# Patient Record
(demographics unavailable — no encounter records)

---

## 2017-12-30 NOTE — EDM.PDOC
ED HPI GENERAL MEDICAL PROBLEM





- General


Chief Complaint: Upper Extremity Injury/Pain


Stated Complaint: LT HAND PAIN


Time Seen by Provider: 12/30/17 12:15


Source of Information: Reports: Patient


History Limitations: Reports: No Limitations





- History of Present Illness


INITIAL COMMENTS - FREE TEXT/NARRATIVE: 





42 y.o.w.f came to the ed due to pain at the base of her right thumb for 1 week 

or so. Pt was seen at her PMD who recommended a splint and motrin. Pt stated, 

those measures do not help. Pt denied trauma, is left handed, has FROM of all 

of her fingers. No other acute medical issues.  /61 pulse 80 Temp 36.6, 

Pulse ox 98% RR 18


Onset Date: 12/23/17


Onset Time: 07:00


Duration: Day(s):, Intermittent


Location: Reports: Upper Extremity, Right


Quality: Reports: Dull


Severity: Mild


Improves with: Reports: Rest


Worsens with: Reports: Movement


Context: Reports: Other (does not remember )


Associated Symptoms: Reports: No Other Symptoms


  ** right hand


Pain Score (Numeric/FACES): 7





- Related Data


 Allergies











Allergy/AdvReac Type Severity Reaction Status Date / Time


 


codeine Allergy  Vomiting Verified 12/30/17 12:36











Home Meds: 


 Home Meds





. [Unable to Verify Home Med List]  12/30/17 [History]











Review of Systems





- Review of Systems


Review Of Systems: See Below


Constitutional: Reports: No Symptoms


Eyes: Reports: No Symptoms


Ears: Reports: No Symptoms


Nose: Reports: No Symptoms


Mouth/Throat: Reports: No Symptoms


Respiratory: Reports: No Symptoms


Cardiovascular: Reports: No Symptoms


GI/Abdominal: Reports: No Symptoms


Genitourinary: Reports: No Symptoms


Musculoskeletal: Reports: Hand Pain (r thumb)


Skin: Reports: No Symptoms


Neurological: Reports: No Symptoms


Psychiatric: Reports: No Symptoms





ED EXAM, GENERAL





- Physical Exam


Exam: See Below


Exam Limited By: No Limitations


General Appearance: Alert, WD/WN, No Apparent Distress


Eye Exam: Bilateral Eye: Normal Inspection


Ears: Normal External Exam


Ear Exam: Bilateral Ear: Auricle Normal


Nose: Normal Inspection


Throat/Mouth: Normal Inspection


Head: Atraumatic


Neck: Normal Inspection


Respiratory/Chest: No Respiratory Distress


Cardiovascular: Normal Peripheral Pulses


Peripheral Pulses: 2+: Radial (L)


GI/Abdominal: Normal Bowel Sounds, Soft


 (Female) Exam: Deferred


Rectal (Female) Exam: Deferred


Back Exam: Normal Inspection


Extremities: Normal Inspection, Limited Range of Motion (of right thumb due to 

pain)


Neurological: Alert


Psychiatric: Normal Affect, Normal Mood


Skin Exam: Warm, Intact, Normal Color


Lymphatic: No Adenopathy





Course





- Vital Signs


Text/Narrative:: 





42 y.o.w.f came to the ed due to pain at the base of her right thumb for 1 week 

or so. Pt was seen at her PMD who recommended a splint and motrin. Pt stated, 

those measures do not help. Pt denied trauma, is left handed, has FROM of all 

of her fingers. No other acute medical issues.  /61 pulse 80 Temp 36.6, 

Pulse ox 98% RR 18


PE: minor tenderness at the base of the right thumb. FROM


Imaging: Not indicat


Impression: R thumb sprain X 1 week


Tx: Motrin, Ice


Reexam: Improved


Plan: D/C with instructions


Last Recorded V/S: 


 Last Vital Signs











Temp      


 


Pulse  86   12/30/17 12:15


 


Resp  16   12/30/17 12:15


 


BP  130/61   12/30/17 12:15


 


Pulse Ox  98   12/30/17 12:15














Departure





- Departure


Time of Disposition: 12:40


Disposition: Home, Self-Care 01


Condition: Good


Clinical Impression: 


Sprain of right thumb


Qualifiers:


 Encounter type: initial encounter Sprain of finger site: metacarpophalangeal 

joint Qualified Code(s): S63.641A - Sprain of metacarpophalangeal joint of 

right thumb, initial encounter








- Discharge Information


Instructions:  Thumb Sprain


Referrals: 


Kandy Mcmahon NP [Primary Care Provider] - 


Forms:  ED Department Discharge


Additional Instructions: 


Please take Motrin, apply ice to the affected area, Please f/u with your PMD 

and or orthopedic surgeon.

## 2018-04-07 NOTE — EDM.PDOC
ED HPI GENERAL MEDICAL PROBLEM





- General


Chief Complaint: Upper Extremity Injury/Pain


Stated Complaint: RT FINGER PAIN


Time Seen by Provider: 04/07/18 00:45


Source of Information: Reports: Patient


History Limitations: Reports: No Limitations





- History of Present Illness


INITIAL COMMENTS - FREE TEXT/NARRATIVE: 





Dilia comes in for inspection of fingers #3,4,5 on right hand that were injured 

when slammed by a car trunk lid about a week ago. There is resiual pain and 

some stiffness that she is concerned about not improving. She is also worried 

about infection. There is no numbness. She has taken no meds. 


  ** Rt hand


Pain Score (Numeric/FACES): 10





- Related Data


 Allergies











Allergy/AdvReac Type Severity Reaction Status Date / Time


 


codeine Allergy  Vomiting Verified 04/07/18 00:32











Home Meds: 


 Home Meds





. [Unable to Verify Home Med List]  12/30/17 [History]











Past Medical History


Cardiovascular History: Reports: CAD, Hypertension, MI


Endocrine/Metabolic History: Reports: Diabetes, Type II, Hypothyroidism





- Past Surgical History


Cardiovascular Surgical History: Reports: Coronary Artery Bypass





Social & Family History





- Family History


Family Medical History: Noncontributory


Endocrine/Metabolic: Reports: Diabetes, Type I





- Tobacco Use


Smoking Status *Q: Current Every Day Smoker


Years of Tobacco use: 20


Packs/Tins Daily: 0.5





- Caffeine Use


Caffeine Use: Reports: Coffee, Soda





- Recreational Drug Use


Recreational Drug Use: No





Review of Systems





- Review of Systems


Review Of Systems: ROS reveals no pertinent complaints other than HPI.





ED EXAM, GENERAL





- Physical Exam


Exam: See Below


Exam Limited By: No Limitations


General Appearance: Alert, WD/WN, No Apparent Distress


Head: Normocephalic


Neck: Normal Inspection, Supple


Respiratory/Chest: Lungs Clear, Normal Breath Sounds


Cardiovascular: Regular Rate, Rhythm


Back Exam: Normal Inspection


Extremities: Limited Range of Motion (Digits #3,#4.#5 of R hand: healing 

abrasions overlying dorsal surfaces at PIP joints, minor swelling, no erythema 

or warmth, flexion of 45 deg only at PIP, 80 deg at DIP; no deformity of the 

MCP joints or R hand; CMS intact)


Neurological: Alert, Oriented, CN II-XII Intact, Normal Gait, No Motor/Sensory 

Deficits


Psychiatric: Normal Affect, Normal Mood


Skin Exam: Warm, Dry


Lymphatic: No Adenopathy





Course





- Vital Signs


Text/Narrative:: 





No meds were administered at the [facility] ED.


Last Recorded V/S: 





 Last Vital Signs











Temp  36.4 C   04/07/18 00:20


 


Pulse  73   04/07/18 00:20


 


Resp  18   04/07/18 00:20


 


BP  151/75 H  04/07/18 00:20


 


Pulse Ox  100   04/07/18 00:20














Departure





- Departure


Time of Disposition: 00:58


Disposition: Home, Self-Care 01


Condition: Fair


Clinical Impression: 


Abrasion of finger of right hand


Qualifiers:


 Encounter type: initial encounter Qualified Code(s): S60.419A - Abrasion of 

unspecified finger, initial encounter





Contusion of finger of right hand


Qualifiers:


 Encounter type: initial encounter Finger: unspecified finger Qualified Code(s)

: S60.00XA - Contusion of unspecified finger without damage to nail, initial 

encounter








- Discharge Information


Referrals: 


PCP,None [Primary Care Provider] - 





- Problem List & Annotations


(1) Abrasion of finger of right hand


SNOMED Code(s): 933992024


   Code(s): S60.419A - ABRASION OF UNSPECIFIED FINGER, INITIAL ENCOUNTER   

Status: Acute   Current Visit: Yes   Annotation/Comment:: Local wound cares   


Qualifiers: 


   Encounter type: initial encounter   Qualified Code(s): S60.419A - Abrasion 

of unspecified finger, initial encounter   





(2) Contusion of finger of right hand


SNOMED Code(s): 02483547


   Code(s): S60.00XA - CONTUSION OF UNSP FINGER WITHOUT DAMAGE TO NAIL, INIT 

ENCNTR   Status: Acute   Current Visit: Yes   Annotation/Comment:: AROM 

encouraged, NSAIDs for analgesic   


Qualifiers: 


   Encounter type: initial encounter   Finger: unspecified finger   Qualified 

Code(s): S60.00XA - Contusion of unspecified finger without damage to nail, 

initial encounter   





- Problem List Review


Problem List Initiated/Reviewed/Updated: Yes





- Assessment/Plan


Plan: 





Follow up if needed.

## 2019-01-13 NOTE — EDM.PDOC
ED HPI GENERAL MEDICAL PROBLEM





- General


Chief Complaint: General


Stated Complaint: FLU SYMPTONS


Time Seen by Provider: 01/13/19 20:00


Source of Information: Reports: Patient, Family


History Limitations: Reports: No Limitations





- History of Present Illness


INITIAL COMMENTS - FREE TEXT/NARRATIVE: 





43 years old w f with IDDM, came to the ed with her SO due to gen bodyache, 

nausea, ear pain and not feeling well. Pt denies trauma, no C/P no Dysuria, had

, however, sick contact. Her blood sugar was 350 PTA. Pt took her insulin, BS 

did not go down, however. No other acute medical issues. /82 RR 16 Pulse 

ox 98% on RA Temp 36.7 Pulse 87


Onset Date: 01/02/19


Onset Time: 08:00


Duration: Day(s):, Getting Worse, Intermittent


Location: Reports: Face, Generalized


Quality: Reports: Ache, Burning


Severity: Moderate


Improves with: Reports: Medication


Worsens with: Reports: None


Context: Reports: Sick Contact


Associated Symptoms: Reports: Nausea/Vomiting, Weakness





- Related Data


 Allergies











Allergy/AdvReac Type Severity Reaction Status Date / Time


 


codeine Allergy  Vomiting Verified 01/13/19 20:22











Home Meds: 


 Home Meds





Amoxicillin/Potassium Clav [Augmentin 875-125 Tablet] 1 each PO BID #20 tablet 

01/13/19 [Rx]


Hydrocort/Neomycin/Polymyxin B [Cortisporin Otic Soln] 3 drop EARBOTH Q8HR #1 

bottle 01/13/19 [Rx]











Past Medical History


Cardiovascular History: Reports: CAD, Hypertension, MI


Endocrine/Metabolic History: Reports: Diabetes, Type II, Hypothyroidism





- Past Surgical History


Cardiovascular Surgical History: Reports: Coronary Artery Bypass





Social & Family History





- Family History


Family Medical History: Noncontributory


Endocrine/Metabolic: Reports: Diabetes, Type I





- Caffeine Use


Caffeine Use: Reports: Coffee, Soda





ED ROS GENERAL





- Review of Systems


Review Of Systems: See Below


Constitutional: Reports: Decreased Appetite


HEENT: Reports: Ear Pain


Respiratory: Reports: No Symptoms


Cardiovascular: Reports: No Symptoms


Endocrine: Reports: High Glucose


GI/Abdominal: Reports: Nausea


: Reports: No Symptoms


Musculoskeletal: Reports: Muscle Pain (geneelized )


Skin: Reports: No Symptoms


Neurological: Reports: No Symptoms


Psychiatric: Reports: No Symptoms


Hematologic/Lymphatic: Reports: No Symptoms


Immunologic: Reports: No Symptoms





ED EXAM, GENERAL





- Physical Exam


Exam: See Below


Exam Limited By: No Limitations


General Appearance: Alert, WD/WN, Moderate Distress


Eye Exam: Bilateral Eye: Normal Inspection


Ears: Normal Canal, Other (OI/OE)


Ear Exam: Bilateral Ear: Auricle Normal


Nose: Normal Inspection, Normal Mucosa


Throat/Mouth: Normal Lips, Normal Voice, No Airway Compromise, Other (dry 

mucosal membrane)


Head: Atraumatic, Normocephalic


Neck: Normal Inspection, Supple, Non-Tender


Respiratory/Chest: No Respiratory Distress, Lungs Clear, Normal Breath Sounds, 

No Accessory Muscle Use, Chest Non-Tender


Cardiovascular: Normal Peripheral Pulses, Regular Rate, Rhythm, No Edema, No 

Gallop, No Murmur, No Rub


Peripheral Pulses: 2+: Brachial (R)


GI/Abdominal: Normal Bowel Sounds, Soft, Non-Tender, No Organomegaly, No 

Abnormal Bruit, No Mass, Pelvis Stable


 (Female) Exam: Deferred


Rectal (Female) Exam: Deferred


Back Exam: Normal Inspection, Full Range of Motion


Extremities: Normal Inspection, Normal Range of Motion, Non-Tender, No Pedal 

Edema


Neurological: Alert, Oriented, CN II-XII Intact, Normal Cognition, Normal Gait, 

No Motor/Sensory Deficits


Psychiatric: Normal Affect, Normal Mood


Skin Exam: Warm, Dry, Intact, Normal Color, No Rash


Lymphatic: No Adenopathy





Course





- Vital Signs


Text/Narrative:: 





43 years old w f with IDDM, came to the ed with her SO due to gen bodyache, 

nausea, ear pain and not feeling well. Pt denies trauma, no C/P no Dysuria, had

, however, sick contact. Her blood sugar was 350 PTA. Pt took her insulin, BS 

did not go down, however. No other acute medical issues. /82 RR 16 Pulse 

ox 98% on RA Temp 36.7 Pulse 87


PE: WNWD w f with gen body ache, not feeling well, ear pain high BS and mild 

nausea, no vomiting


Labs: CBC nl, BMP: Na 130 K 4.6 GFR > 60 HGA1C 10.5 UA Glucosuria


Impression: IDDM, hyperglycemia, Noncompliant, OM/OI. viral syndrome, tension H/

A


Tx: Augmentin, Reg Insulin, NS, Zofran, Toradol


Reexam: Improved, BS was 256 on D/C


Plan: D/C with instructions


Last Recorded V/S: 


 Last Vital Signs











Temp  37.2 C   01/13/19 22:35


 


Pulse  100   01/13/19 20:00


 


Resp  18   01/13/19 22:35


 


BP  147/81 H  01/13/19 22:35


 


Pulse Ox  97   01/13/19 22:35














- Orders/Labs/Meds


Orders: 


 Active Orders 24 hr











 Category Date Time Status


 


 Accu Check [Blood Glucose Check, Bedside] [RC] ONETIME Care  01/13/19 20:10 

Active


 


 Peripheral IV Insertion Adult [OM.PC] Routine Oth  01/13/19 21:14 Ordered











Labs: 


 Laboratory Tests











  01/13/19 01/13/19 01/13/19 Range/Units





  20:12 20:35 20:35 


 


WBC   5.4   (4.5-12.0)  X10-3/uL


 


RBC   4.47   (3.23-5.20)  x10(6)uL


 


Hgb   13.7   (11.5-15.5)  g/dL


 


Hct   40.7   (30.0-51.3)  %


 


MCV   91.2   (80-96)  fL


 


MCH   30.6   (27.7-33.6)  pg


 


MCHC   33.6   (32.2-35.4)  g/dL


 


RDW   11.7   (11.5-15.5)  %


 


Plt Count   178   (125-369)  X10(3)uL


 


MPV   8.1   (7.4-10.4)  fL


 


Neut % (Auto)   73.6   (46-82)  %


 


Lymph % (Auto)   14.3   (13-37)  %


 


Mono % (Auto)   8.3   (4-12)  %


 


Eos % (Auto)   3   (1.0-5.0)  %


 


Baso % (Auto)   0   (0-2)  %


 


Neut # (Auto)   4.0   (1.6-8.3)  #


 


Lymph # (Auto)   0.8   (0.6-5.0)  #


 


Mono # (Auto)   0.4   (0.0-1.3)  #


 


Eos # (Auto)   0.2   (0.0-0.8)  #


 


Baso # (Auto)   0.0   (0.0-0.2)  #


 


Sodium    130 L  (135-145)  mmol/L


 


Potassium    4.6  (3.5-5.3)  mmol/L


 


Chloride    97 L  (100-110)  mmol/L


 


Carbon Dioxide    26  (21-32)  mmol/L


 


BUN    13  (7-18)  mg/dL


 


Creatinine    1.0  (0.55-1.02)  mg/dL


 


Est Cr Clr Drug Dosing    57.37  mL/min


 


Estimated GFR (MDRD)    > 60  (>60)  


 


BUN/Creatinine Ratio    13.0  (9-20)  


 


Glucose    311 H  ()  mg/dL


 


POC Glucose  316 H    ()  mg/dL


 


Hemoglobin A1c     (4.5-6.2)  %


 


Calcium    8.6  (8.6-10.2)  mg/dL


 


Total Bilirubin    0.3  (0.1-1.3)  mg/dL


 


AST    12  (5-25)  IU/L


 


ALT    22  (12-36)  U/L


 


Alkaline Phosphatase    94  ()  IU/L


 


Total Protein    7.0  (6.0-8.0)  g/dL


 


Albumin    3.1 L  (3.5-5.2)  g/dL


 


Globulin    3.9  g/dL


 


Albumin/Globulin Ratio    0.8  


 


Urine Color     (YELLOW)  


 


Urine Appearance     (CLEAR)  


 


Urine pH     (5.0-6.5)  


 


Ur Specific Gravity     (1.010-1.025)  


 


Urine Protein     (NEGATIVE)  mg/dL


 


Urine Glucose (UA)     (NEGATIVE)  mg/dL


 


Urine Ketones     (NEGATIVE)  mg/dL


 


Urine Occult Blood     (NEGATIVE)  


 


Urine Nitrite     (NEGATIVE)  


 


Urine Bilirubin     (NEGATIVE)  


 


Urine Urobilinogen     (NEGATIVE)  mg/dL


 


Ur Leukocyte Esterase     (NEGATIVE)  


 


Urine RBC     (0)  


 


Urine WBC     (0)  


 


Ur Squamous Epith Cells     (NS,R,O)  


 


Urine Bacteria     (NS)  














  01/13/19 01/13/19 01/13/19 Range/Units





  20:35 20:51 22:14 


 


WBC     (4.5-12.0)  X10-3/uL


 


RBC     (3.23-5.20)  x10(6)uL


 


Hgb     (11.5-15.5)  g/dL


 


Hct     (30.0-51.3)  %


 


MCV     (80-96)  fL


 


MCH     (27.7-33.6)  pg


 


MCHC     (32.2-35.4)  g/dL


 


RDW     (11.5-15.5)  %


 


Plt Count     (125-369)  X10(3)uL


 


MPV     (7.4-10.4)  fL


 


Neut % (Auto)     (46-82)  %


 


Lymph % (Auto)     (13-37)  %


 


Mono % (Auto)     (4-12)  %


 


Eos % (Auto)     (1.0-5.0)  %


 


Baso % (Auto)     (0-2)  %


 


Neut # (Auto)     (1.6-8.3)  #


 


Lymph # (Auto)     (0.6-5.0)  #


 


Mono # (Auto)     (0.0-1.3)  #


 


Eos # (Auto)     (0.0-0.8)  #


 


Baso # (Auto)     (0.0-0.2)  #


 


Sodium     (135-145)  mmol/L


 


Potassium     (3.5-5.3)  mmol/L


 


Chloride     (100-110)  mmol/L


 


Carbon Dioxide     (21-32)  mmol/L


 


BUN     (7-18)  mg/dL


 


Creatinine     (0.55-1.02)  mg/dL


 


Est Cr Clr Drug Dosing     mL/min


 


Estimated GFR (MDRD)     (>60)  


 


BUN/Creatinine Ratio     (9-20)  


 


Glucose     ()  mg/dL


 


POC Glucose    248 H  ()  mg/dL


 


Hemoglobin A1c  10.2 H    (4.5-6.2)  %


 


Calcium     (8.6-10.2)  mg/dL


 


Total Bilirubin     (0.1-1.3)  mg/dL


 


AST     (5-25)  IU/L


 


ALT     (12-36)  U/L


 


Alkaline Phosphatase     ()  IU/L


 


Total Protein     (6.0-8.0)  g/dL


 


Albumin     (3.5-5.2)  g/dL


 


Globulin     g/dL


 


Albumin/Globulin Ratio     


 


Urine Color   Yellow   (YELLOW)  


 


Urine Appearance   Clear   (CLEAR)  


 


Urine pH   5.0   (5.0-6.5)  


 


Ur Specific Gravity   1.015   (1.010-1.025)  


 


Urine Protein   30 H   (NEGATIVE)  mg/dL


 


Urine Glucose (UA)   >1000 H   (NEGATIVE)  mg/dL


 


Urine Ketones   Negative   (NEGATIVE)  mg/dL


 


Urine Occult Blood   Moderate H   (NEGATIVE)  


 


Urine Nitrite   Negative   (NEGATIVE)  


 


Urine Bilirubin   Negative   (NEGATIVE)  


 


Urine Urobilinogen   Normal   (NEGATIVE)  mg/dL


 


Ur Leukocyte Esterase   Negative   (NEGATIVE)  


 


Urine RBC   0-5   (0)  


 


Urine WBC   0-5   (0)  


 


Ur Squamous Epith Cells   Occasional   (NS,R,O)  


 


Urine Bacteria   Rare H   (NS)  











Meds: 


Medications














Discontinued Medications














Generic Name Dose Route Start Last Admin





  Trade Name Freq  PRN Reason Stop Dose Admin


 


Sodium Chloride  1,000 mls @ 999 mls/hr  01/13/19 21:03  01/13/19 21:10





  Normal Saline  IV  01/13/19 22:03  999 mls/hr





  .BOLUS ONE   Administration





     





     





     





     


 


Insulin Human Regular  7 unit  01/14/19 07:30  





  Humulin R  SUBCUT   





  BIDAC RASHEL   





     





     





     





     


 


Insulin Human Regular  7 unit  01/13/19 21:14  01/13/19 21:15





  Humulin R  SUBCUT  01/13/19 21:15  7 unit





  ONETIME STA   Administration





     





     





     





     


 


Insulin Human Regular  Confirm  01/13/19 21:16  01/13/19 21:23





  Humulin R  Administered  01/13/19 21:17  Not Given





  Dose   





  300 unit   





  .ROUTE   





  .STK-MED ONE   





     





     





     





     


 


Ketorolac Tromethamine  30 mg  01/13/19 22:29  01/13/19 22:30





  Toradol  IVPUSH  01/13/19 22:30  30 mg





  ONETIME ONE   Administration





     





     





     





     


 


Ondansetron HCl  8 mg  01/13/19 21:09  01/13/19 21:15





  Zofran  IVPUSH  01/13/19 21:10  8 mg





  ONETIME ONE   Administration





     





     





     





     


 


Sodium Chloride  10 ml  01/13/19 21:14  01/13/19 21:10





  Saline Flush  FLUSH   10 ml





  ASDIRECTED PRN   Administration





  Keep Vein Open   





     





     





     














Departure





- Departure


Time of Disposition: 22:31


Disposition: Home, Self-Care 01


Condition: Good


Clinical Impression: 


 Hyperglycemia due to type 2 diabetes mellitus, Otitis media, Viral syndrome








- Discharge Information


Prescriptions: 


Hydrocort/Neomycin/Polymyxin B [Cortisporin Otic Soln] 3 drop EARBOTH Q8HR #1 

bottle


Amoxicillin/Potassium Clav [Augmentin 875-125 Tablet] 1 each PO BID #20 tablet


Instructions:  Ear Drops, Adult, Otitis Media, Adult, Easy-to-Read


Referrals: 


PCP,None [Primary Care Provider] - 


Forms:  ED Department Discharge


Additional Instructions: 


Please check your blood sugar every 6-8 and adjust the insulin does accordingly

, take abx as recommended, please f/u, come back if your symptoms get worse 

acutely.





- My Orders


Last 24 Hours: 


My Active Orders





01/13/19 20:10


Accu Check [Blood Glucose Check, Bedside] [RC] ONETIME 





01/13/19 21:14


Peripheral IV Insertion Adult [OM.PC] Routine 














- Assessment/Plan


Last 24 Hours: 


My Active Orders





01/13/19 20:10


Accu Check [Blood Glucose Check, Bedside] [RC] ONETIME 





01/13/19 21:14


Peripheral IV Insertion Adult [OM.PC] Routine

## 2019-01-30 NOTE — EDM.PDOC
ED HPI GENERAL MEDICAL PROBLEM





- General


Chief Complaint: Abdominal Pain


Stated Complaint: BOWEL ISSUE


Time Seen by Provider: 19 10:00


Source of Information: Reports: Patient, Family


History Limitations: Reports: No Limitations





- History of Present Illness


INITIAL COMMENTS - FREE TEXT/NARRATIVE: 





43 y.o.w.f -smoker- H/O CAD, S/O triple CABG, IDDM, came to the ed due to 

epigastric pain, gen abd. after eating and unable to pass stool unless taking a 

laxative and gen Malaise.  No N/V no dizziness. No Trauma. No other acute 

medical issues.  /80 RR 18 Pulse ox 98% on RA Temp 36.8 Pulse 101


Onset Date: 19


Onset Time: 06:00


Duration: Getting Worse, Intermittent


Location: Reports: Abdomen (epigastric pain)


Quality: Reports: Ache, Burning


Severity: Mild


Improves with: Reports: Medication, Other (not eating)


Worsens with: Reports: Eating


Context: Reports: Other


  ** Abdominal


Pain Score (Numeric/FACES): 8





- Related Data


 Allergies











Allergy/AdvReac Type Severity Reaction Status Date / Time


 


codeine Allergy  Vomiting Verified 19 20:22


 


naproxen [From Aleve] Allergy  Rash Verified 19 10:04











Home Meds: 


 Home Meds





Amiodarone [Cordarone] 200 mg DAILY 19 [History]


Bisacodyl [Correctol] 15 mg PO DAILY PRN 19 [History]


Carvedilol 6.25 mg 1200 19 [History]


Furosemide 20 mg ASDIRECTED PRN 19 [History]


Insulin Glargine,Hum.Rec.Anlog [Basaglar Kwikpen U-100] 15 unit SQ BEDTIME  [History]


Levothyroxine [Synthroid] 50 mcg PO ACBREAKFAST 19 [History]


Omeprazole 20 mg PO BID #60 tabRadharap 19 [Rx]


atorvaSTATin Calcium [Atorvastatin Calcium] 80 mg 1200 19 [History]


metFORMIN [Glucophage XR] 500 mg PO BIDMEALS 19 [History]











Past Medical History


Cardiovascular History: Reports: CAD, High Cholesterol, Hypertension, MI


Other Cardiovascular History: 3 vessel bypass


Gastrointestinal History: Reports: Chronic Constipation, GERD, GI Bleed, PUD


Genitourinary History: Reports: None


OB/GYN History: Reports: Pregnancy


Other OB/GYN History: [id number]


Endocrine/Metabolic History: Reports: Diabetes, Type II, Hypothyroidism





- Infectious Disease History


Infectious Disease History: Reports: Chicken Pox, Shingles





- Past Surgical History


HEENT Surgical History: Reports: Myringotomy w Tube(s)


Other HEENT Surgeries/Procedures: bilat tubes in ears


Cardiovascular Surgical History: Reports: Coronary Artery Bypass


GI Surgical History: Reports: EGD


Female  Surgical History: Reports:  Section, Tubal Ligation


Other Female  Surgeries/Procedures: CS x 1





Social & Family History





- Family History


Family Medical History: Noncontributory


Endocrine/Metabolic: Reports: Diabetes, Type I





- Tobacco Use


Smoking Status *Q: Current Every Day Smoker


Years of Tobacco use: 30


Packs/Tins Daily: 1





- Caffeine Use


Caffeine Use: Reports: Coffee, Soda





- Recreational Drug Use


Recreational Drug Use: No





ED ROS GENERAL





- Review of Systems


Review Of Systems: See Below


Constitutional: Reports: Malaise


HEENT: Reports: No Symptoms


Respiratory: Reports: No Symptoms


Cardiovascular: Reports: Chest Pain


Endocrine: Reports: No Symptoms


GI/Abdominal: Reports: No Symptoms


: Reports: No Symptoms


Musculoskeletal: Reports: No Symptoms


Skin: Reports: No Symptoms


Neurological: Reports: No Symptoms


Psychiatric: Reports: No Symptoms


Hematologic/Lymphatic: Reports: No Symptoms


Immunologic: Reports: No Symptoms





ED EXAM, GI/ABD





- Physical Exam


Exam: See Below


Exam Limited By: No Limitations


General Appearance: Alert, WD/WN, Mild Distress


Eyes: Bilateral: Normal Appearance


Ears: Normal External Exam


Nose: Normal Inspection


Throat/Mouth: Normal Inspection, Normal Voice, No Airway Compromise


Head: Atraumatic, Normocephalic


Neck: Normal Inspection, Supple, Non-Tender, Full Range of Motion


Respiratory/Chest: No Respiratory Distress, Lungs Clear, Normal Breath Sounds, 

Chest Non-Tender


Cardiovascular: Normal Peripheral Pulses, Regular Rate, Rhythm, No Edema, No 

Gallop, No Murmur


GI/Abdominal Exam: Normal Bowel Sounds, No Organomegaly, No Abnormal Bruit, No 

Mass, Pelvis Stable, Tender (epigatric)


 (Female) Exam: Deferred


Rectal (Female) Exam: Deferred


Back Exam: Normal Inspection, Full Range of Motion


Extremities: Normal Inspection, Normal Range of Motion, Non-Tender, No Pedal 

Edema, Normal Capillary Refill


Neurological: Alert, Oriented, CN II-XII Intact, Normal Cognition, Normal Gait


Psychiatric: Normal Affect, Normal Mood


Skin Exam: Warm, Dry, Intact, Normal Color, No Rash


Lymphatic: No Adenopathy





EKG INTERPRETATION


EKG Date: 19


Time: 12:40


Rhythm: NSR


Rate (Beats/Min): 85


Axis: Normal


P-Wave: Present


QRS: Normal


ST-T: Normal


QT: Normal


Comparison: NA - No Prior EKG


EKG Interpretation Comments: 





second ECG: same as 1st ECG,  HR 89 ST on Lead II 1/2 mm elevated





Course





- Vital Signs


Text/Narrative:: 








43 y.o.w.f -smoker- H/O CAD, S/O triple CABG, IDDM, came to the ed due to 

epigastric pain, gen abd. after eating and unable to pass stool unless taking a 

laxative and gen Malaise.  No N/V no dizziness. No Trauma. No other acute 

medical issues.  /80 RR 18 Pulse ox 98% on RA Temp 36.8 Pulse 101


PE: WNWD W F with Gen Malaise and epigastric pain and mucus stool


Imaging: Abd: NAD. Nephrolith left kidney


Labs: Ca 10.9 Amylase 157 GFR 47 WBC 14.7 Neutros 11.1 Na 131 K 5.2 GFR 47 BUN 

29 Cr 1.7 Stool was brown but Quiac pos. 


Impression: Dehydration, Hypercalcemia, pancreatitis, Hematochezia  

hyponatremia (amylase elevated, Lipase test not available), Gen Abd. pain after 

after eating Dx: Mesenteric angina.


Tx: NS Zofran, GI coctail, Protronix 


Reexam: 100% pain free


Plan: D/C with instructions


 


Last Recorded V/S: 


 Last Vital Signs











Temp  36.3 C   19 10:00


 


Pulse  94   19 15:10


 


Resp  18   19 15:10


 


BP  111/68   19 15:10


 


Pulse Ox  99   19 15:10














- Orders/Labs/Meds


Orders: 


 Active Orders 24 hr











 Category Date Time Status


 


 Abdomen 2V AP Flat Upright [CR] Stat Exams  19 11:17 Taken











Labs: 


 Laboratory Tests











  19 Range/Units





  11:27 11:27 11:28 


 


WBC  14.7 H    (4.5-12.0)  X10-3/uL


 


RBC  4.91    (3.23-5.20)  x10(6)uL


 


Hgb  15.0    (11.5-15.5)  g/dL


 


Hct  44.7    (30.0-51.3)  %


 


MCV  91.0    (80-96)  fL


 


MCH  30.5    (27.7-33.6)  pg


 


MCHC  33.6    (32.2-35.4)  g/dL


 


RDW  11.7    (11.5-15.5)  %


 


Plt Count  338    (125-369)  X10(3)uL


 


MPV  8.4    (7.4-10.4)  fL


 


Neut % (Auto)  75.1    (46-82)  %


 


Lymph % (Auto)  14.0    (13-37)  %


 


Mono % (Auto)  6.4    (4-12)  %


 


Eos % (Auto)  4    (1.0-5.0)  %


 


Baso % (Auto)  0    (0-2)  %


 


Neut # (Auto)  11.1 H    (1.6-8.3)  #


 


Lymph # (Auto)  2.1    (0.6-5.0)  #


 


Mono # (Auto)  0.9    (0.0-1.3)  #


 


Eos # (Auto)  0.6    (0.0-0.8)  #


 


Baso # (Auto)  0.0    (0.0-0.2)  #


 


Sodium   131 L   (135-145)  mmol/L


 


Potassium   5.2   (3.5-5.3)  mmol/L


 


Chloride   97 L   (100-110)  mmol/L


 


Carbon Dioxide   25   (21-32)  mmol/L


 


BUN   29 H D   (7-18)  mg/dL


 


Creatinine   1.2 H   (0.55-1.02)  mg/dL


 


Est Cr Clr Drug Dosing   47.81   mL/min


 


Estimated GFR (MDRD)   49 L   (>60)  


 


BUN/Creatinine Ratio   24.2 H   (9-20)  


 


Glucose   311 H   ()  mg/dL


 


Calcium   10.9 H D   (8.6-10.2)  mg/dL


 


Total Bilirubin    0.5  (0.1-1.3)  mg/dL


 


Direct Bilirubin    0.12  (0.10-0.20)  mg/dL


 


AST    10  D  (5-25)  IU/L


 


ALT    32  D  (12-36)  U/L


 


Alkaline Phosphatase    108  ()  IU/L


 


Total Protein    8.3 H  (6.0-8.0)  g/dL


 


Albumin    3.7  (3.5-5.2)  g/dL


 


Amylase   172 H   ()  U/L











Meds: 


Medications














Discontinued Medications














Generic Name Dose Route Start Last Admin





  Trade Name Yeq  PRN Reason Stop Dose Admin


 


Al Hydroxide/Mg Hydroxide 15  0 ml  19 10:35  19 10:49





  ml/ Lidocaine HCl 15 ml  PO  19 10:36  15 ml





  ONETIME ONE   Administration





     





     





     





     


 


Sodium Chloride  1,000 mls @ 999 mls/hr  19 13:02  19 13:33





  Normal Saline  IV  19 14:02  999 mls/hr





  .BOLUS ONE   Administration





     





     





     





     


 


Ondansetron HCl  8 mg  19 10:35  19 10:45





  Zofran Odt  PO  19 10:36  8 mg





  ONETIME ONE   Administration





     





     





     





     


 


Pantoprazole Sodium  40 mg  19 13:11  19 13:46





  Protonix Iv***  IVPUSH  19 13:12  40 mg





  ONETIME ONE   Administration





     





     





     





     














Departure





- Departure


Time of Disposition: 14:59


Disposition: Home, Self-Care 01


Condition: Good


Clinical Impression: 


 Hypercalcemia, Hyponatremia, Stool mucus, Hematochezia








- Discharge Information


Prescriptions: 


Omeprazole 20 mg PO BID #60 tab.rap.


Instructions:  Hypercalcemia, Acute Pancreatitis, Easy-to-Read, Ondansetron 

injection, Pantoprazole injection, Dehydration, Adult, Easy-to-Read, Diarrhea, 

Adult, Easy-to-Read


Referrals: 


Laura Diaz NP [Primary Care Provider] - 


Js Miller MD [Physician] - 


Forms:  ED Department Discharge


Additional Instructions: 


Please continue your medications, please follow up with Dr. Miller at clinic 

for recheck.   Please come back if your symptoms worsen acutely.





Omeprazole 20mg twice a day, start tomorrow.





- My Orders


Last 24 Hours: 


My Active Orders





19 11:17


Abdomen 2V AP Flat Upright [CR] Stat 














- Assessment/Plan


Last 24 Hours: 


My Active Orders





19 11:17


Abdomen 2V AP Flat Upright [CR] Stat

## 2019-02-16 NOTE — EDM.PDOC
ED HPI GENERAL MEDICAL PROBLEM





- General


Stated Complaint: ALLERGIC REACTION


Time Seen by Provider: 19 21:55


Source of Information: Reports: Patient


History Limitations: Reports: No Limitations





- History of Present Illness


INITIAL COMMENTS - FREE TEXT/NARRATIVE: 





Martin is a 43-year-old., Smoking  2 para 2 last disappeared years ago 

presents with a cane Cristian pain reliever at 8 PM. She said admitted itching 

immediately. She denies shortness of breath wheezing throat swelling 

difficulties with speech chest pain fast heart rate diaphoresis lightheadedness 

abdominal pain stridor cough muscle aches and pains. Her last episode of 

allergic reaction was that of dermatitis scratching itching which she took 

Aleve approximately a year ago. She is allergic to naproxen and codeine. She 

uses to drug and hypertensive therapy carvedilol and amiodarone. She is 

diabetic and hypothyroid and  treated for GERD.


He does not have a heart condition. She has chronic shortness of lung disease 

from her smoking.





- Related Data


 Allergies











Allergy/AdvReac Type Severity Reaction Status Date / Time


 


codeine Allergy  Vomiting Verified 19 23:04


 


naproxen [From Aleve] Allergy  Rash Verified 19 23:04











Home Meds: 


 Home Meds





Amiodarone [Cordarone] 200 mg DAILY 19 [History]


Bisacodyl [Correctol] 15 mg PO DAILY PRN 19 [History]


Carvedilol 6.25 mg 1200 19 [History]


Furosemide 20 mg ASDIRECTED PRN 19 [History]


Insulin Glargine,Hum.Rec.Anlog [Basaglar Kwikpen U-100] 15 unit SQ BEDTIME  [History]


Levothyroxine [Synthroid] 50 mcg PO ACBREAKFAST 19 [History]


Omeprazole 20 mg PO BID #60 tab.rap.dr 19 [Rx]


atorvaSTATin Calcium [Atorvastatin Calcium] 80 mg 1200 19 [History]


metFORMIN [Glucophage XR] 500 mg PO BIDMEALS 19 [History]


hydrOXYzine HCl [Hydroxyzine HCl] 50 mg PO Q6HR PRN #20 ml 19 [Rx]











Past Medical History


Cardiovascular History: Reports: CAD, High Cholesterol, Hypertension, MI


Other Cardiovascular History: 3 vessel bypass


Gastrointestinal History: Reports: Chronic Constipation, GERD, GI Bleed, PUD


Genitourinary History: Reports: None


OB/GYN History: Reports: Pregnancy


Other OB/GYN History: [id number]


Endocrine/Metabolic History: Reports: Diabetes, Type II, Hypothyroidism





- Infectious Disease History


Infectious Disease History: Reports: Chicken Pox, Shingles





- Past Surgical History


HEENT Surgical History: Reports: Myringotomy w Tube(s)


Other HEENT Surgeries/Procedures: bilat tubes in ears


Cardiovascular Surgical History: Reports: Coronary Artery Bypass


GI Surgical History: Reports: EGD


Female  Surgical History: Reports:  Section, Tubal Ligation


Other Female  Surgeries/Procedures: CS x 1





Social & Family History





- Family History


Family Medical History: Noncontributory


Endocrine/Metabolic: Reports: Diabetes, Type I





- Caffeine Use


Caffeine Use: Reports: Coffee, Soda





ED ROS GENERAL





- Review of Systems


Review Of Systems: ROS reveals no pertinent complaints other than HPI.





ED EXAM, GENERAL





- Physical Exam


Exam: See Below


Free Text/Narrative:: 





Patient ended moderate distress and continually itches her forearms. Mild rash 

noted on forearms. She is attended by her . She has a smoking odor to 

her breath. An looks older than her age. She is not short of breath nor does 

she have chest pain no tachypnea no tachycardia noted.


Exam Limited By: No Limitations


General Appearance: Alert, WD/WN, Moderate Distress


Eye Exam: Bilateral Eye: Normal Inspection


Ear Exam: Bilateral Ear: Auricle Normal, Canal Normal, TM normal


Nose: Normal Inspection


Throat/Mouth: Normal Inspection, Normal Lips, Normal Teeth, Normal Gums, Normal 

Oropharynx, Normal Voice, No Airway Compromise, Other (No angioedema no edema 

of the uvula or pharynx.)


Head: Atraumatic, Normocephalic


Neck: Normal Inspection, Non-Tender, Carotid Bruit, Other (No tracheal tug or 

tracheal deviation)


Respiratory/Chest: No Respiratory Distress, Lungs Clear, Normal Breath Sounds, 

No Accessory Muscle Use, Chest Non-Tender, Other (No tachypnea no difficulty 

breathing no wheezes no stridor)


Cardiovascular: Normal Peripheral Pulses, Regular Rate, Rhythm, No Edema, No 

Gallop, No JVD, No Murmur, No Rub, Other (No diaphoresis normal pink colored 

skin)


Peripheral Pulses: 2+: Radial (L), Radial (R)


GI/Abdominal: Normal Bowel Sounds, Soft, Non-Tender, No Organomegaly, No 

Distention, No Abnormal Bruit, Other (Mild increase abdominal girth)


 (Female) Exam: Deferred


Rectal (Female) Exam: Deferred


Back Exam: Normal Inspection, Full Range of Motion


Extremities: Normal Inspection, Normal Range of Motion, Non-Tender, No Pedal 

Edema, Normal Capillary Refill


Neurological: Alert, Oriented, CN II-XII Intact, Normal Cognition, Normal Gait, 

Normal Reflexes, No Motor/Sensory Deficits


Lymphatic: No Adenopathy





Course





- Orders/Labs/Meds


Orders: 


 Active Orders 24 hr











 Category Date Time Status


 


 Sodium Chloride 0.9% [Normal Saline] 1,000 ml Med  19 22:15 Ordered





 IV ASDIRECTED   








 Medication Orders





Sodium Chloride (Normal Saline)  1,000 mls @ 500 mls/hr IV ASDIRECTED RASHEL


   Last Admin: 19 22:11 Dose:  500 mls/hr








Meds: 


Medications











Generic Name Dose Route Start Last Admin





  Trade Name Freq  PRN Reason Stop Dose Admin


 


Sodium Chloride  1,000 mls @ 500 mls/hr  19 22:15  19 22:11





  Normal Saline  IV   500 mls/hr





  ASDIRECTED RASHEL   Administration





     





     





     





     














Discontinued Medications














Generic Name Dose Route Start Last Admin





  Trade Name Freq  PRN Reason Stop Dose Admin


 


Diphenhydramine HCl  50 mg  19 22:01  





  Benadryl  IVPUSH  19 22:02  





  ONETIME ONE   





     





     





     





     


 


Epinephrine HCl  0.3 mg  19 22:21  





  Adrenalin  SUBCUT  19 22:22  





  ONETIME ONE   





     





     





     





     


 


Hydrocortisone Sodium Succinate  100 mg  19 22:01  19 22:12





  Solu-Cortef  IVPUSH  19 22:02  100 mg





  ONETIME ONE   Administration





     





     





     





     


 


Hydroxyzine HCl  25 mg  19 22:04  





  Atarax  PO  19 22:05  





  ONETIME ONE   





     





     





     





     


 


Pantoprazole Sodium  40 mg  19 22:02  19 22:19





  Protonix Iv***  IVPUSH  19 22:03  40 mg





  ONETIME ONE   Administration





     





     





     





     














Departure





- Departure


Time of Disposition: 22:20 (Patient has dermal allergic reaction. No 

involvement of respiratory passages, heart, no hypotension or tachycardia or 

tachypnea. No stridor no cough no tracheal deviation no tracheal tug no 

suggestion of compromise either cardiorespiratory status. No GI symptoms. She 

was treated aggressively with intravenous flush, Benadryl was not ordered since 

she had 50 mg prior to coming to the hospital, 100 mg hydrocortisone IV, 

protonixs 40 mg IV, Atarax 25 mg by mouth. Even though she had Benadryl, use 

Atarax -  drug of choice for dermatologist for itching. If necessary she'll 

receive triamcinolone ointment for the pruitius. At this point we'll forego 

this treatment. She does not need epinephrine now.at  50 % improved but 

still itchy. Start Triamcinolone cream( ointment more efficacious but too  much 

clothing soiling anticipated.)


Disposition: Home, Self-Care 01


Condition: Good


Clinical Impression: 


 Smoker, S/P CABG x 3, Diabetes 1.5, managed as type 2





Allergic reaction caused by a drug


Qualifiers:


 Encounter type: initial encounter Qualified Code(s): T78.40XA - Allergy, 

unspecified, initial encounter





COPD (chronic obstructive pulmonary disease)


Qualifiers:


 COPD type: unspecified COPD Qualified Code(s): J44.9 - Chronic obstructive 

pulmonary disease, unspecified





Hypertension


Qualifiers:


 Hypertension type: essential hypertension Qualified Code(s): I10 - Essential (

primary) hypertension








- Discharge Information


*PRESCRIPTION DRUG MONITORING PROGRAM REVIEWED*: Not Applicable


*COPY OF PRESCRIPTION DRUG MONITORING REPORT IN PATIENT [facility]: Not Applicable


Additional Instructions: 


40 allergic reaction received cortisone. This was last 6-8 hours.





Given prescription for prednisone to take tablet twice a day 20 mg each for the 

next 2 days. Also Benadryl which have at home 50 mg 4 times a day and Atarax 

drug of choice for itching 25 mg 3 times a day. If you feel too dizzy and 

lightheaded with discoloration of antihistamines and stop the Benadryl. He was 

Protonix one tabletsone





- My Orders


Last 24 Hours: 


My Active Orders





19 22:15


Sodium Chloride 0.9% [Normal Saline] 1,000 ml IV ASDIRECTED 














- Assessment/Plan


Last 24 Hours: 


My Active Orders





19 22:15


Sodium Chloride 0.9% [Normal Saline] 1,000 ml IV ASDIRECTED

## 2020-05-26 NOTE — EDM.PDOC
ED HPI GENERAL MEDICAL PROBLEM





- General


Stated Complaint: R FOOT PINKY TOE INJURY


Time Seen by Provider: 20 17:40


Source of Information: Reports: Patient


History Limitations: Reports: No Limitations





- History of Present Illness


INITIAL COMMENTS - FREE TEXT/NARRATIVE: 





45-year-old female who reports that a dog was attacking her cat and she was 

trying to get the dog off of the cat and she kicked at the dog and either hit 

the dog or hit a stove that the dog and cat were around and sustained an injury 

to the fourth and fifth toes on the right foot. This occurred approximate 5:05 

PM. She reports that the right fifth toe "seems to bend sideways". She reports 0

/10 level of pain now at rest but when she bears weight or tries to put 

pressure on the right fifth toe the pain goes up to a 10/10. There was other 

injuries. There was some bleeding from the right fourth and fifth toes but this 

has been controlled with direct pressure. She reports that she is a diabetic 

and her blood sugars are not always very well-controlled. He states that when 

she remembers to take her insulin her blood sugars are fairly well controlled 

but when she does not take her insulin, she can be out of control. No nausea or 

vomiting. No difficulty breathing. No chest pain. Her no other associated signs 

or symptoms. There are no other modifying factors.


Onset: Today (5:05 PM)


Duration: Constant


Location: Reports: Lower Extremity, Right (Right fourth and fifth toes)


Quality: Reports: Sharp


Severity: Mild (to severe)


Improves with: Reports: Rest


Worsens with: Reports: Other (Palpation.Weight on the foot.), Movement


Associated Symptoms: Reports: No Other Symptoms


Treatments PTA: Reports: Other (see below) (Nothing)


  ** right toe


Pain Score (Numeric/FACES): 6





- Related Data


 Allergies











Allergy/AdvReac Type Severity Reaction Status Date / Time


 


codeine Allergy  Vomiting Verified 20 18:36


 


naproxen [From Aleve] Allergy  Rash Verified 20 18:36











Home Meds: 


 Home Meds





Amiodarone [Cordarone] 200 mg PO DAILY 19 [History]


Furosemide 20 mg ASDIRECTED PRN 19 [History]


Insulin Glargine,Hum.Rec.Anlog [Basaglar Kwikpen U-100] 15 unit SQ BEDTIME  [History]


Levothyroxine [Synthroid] 50 mcg PO ACBREAKFAST 19 [History]


atorvaSTATin Calcium [Atorvastatin Calcium] 80 mg PO 1200 19 [History]


carvediloL [Carvedilol] 6.25 mg PO 1200 19 [History]


metFORMIN [Glucophage XR] 500 mg PO BIDMEALS 19 [History]


hydrOXYzine HCL [Hydroxyzine HCl] 50 mg PO Q6HR PRN #20 ml 19 [Rx]


Mupirocin Oint [Bactroban Oint] 22 gm TP TID #1 tube 20 [Rx]











Past Medical History


Cardiovascular History: Reports: CAD, High Cholesterol, Hypertension, MI


Other Cardiovascular History: 3 vessel bypass


Gastrointestinal History: Reports: Chronic Constipation, GERD, GI Bleed, PUD


Genitourinary History: Reports: None


Other OB/GYN History: [id number]


Endocrine/Metabolic History: Reports: Diabetes, Type II, Hypothyroidism





- Infectious Disease History


Infectious Disease History: Reports: Chicken Pox, Shingles





- Past Surgical History


HEENT Surgical History: Reports: Myringotomy w Tube(s)


Other HEENT Surgeries/Procedures: bilat tubes in ears


Cardiovascular Surgical History: Reports: Coronary Artery Bypass


GI Surgical History: Reports: EGD


Female  Surgical History: Reports:  Section, Tubal Ligation





Social & Family History





- Family History


Endocrine/Metabolic: Reports: Diabetes, Type I





- Tobacco Use


Smoking Status *Q: Current Every Day Smoker





- Caffeine Use


Caffeine Use: Reports: Coffee, Soda





- Alcohol Use


Alcohol Use History: No





- Living Situation & Occupation


Occupation: Unemployed





Review of Systems





- Review of Systems


Review Of Systems: See Below


Constitutional: Reports: No Symptoms


Eyes: Reports: No Symptoms


Ears: Reports: No Symptoms


Nose: Reports: No Symptoms


Mouth/Throat: Reports: No Symptoms


Respiratory: Reports: No Symptoms


Cardiovascular: Reports: No Symptoms


GI/Abdominal: Reports: No Symptoms


Genitourinary: Reports: No Symptoms


Musculoskeletal: Reports: Foot Pain (Right fifth toe pain)


Skin: Reports: Wound (Right fourth and fifth toes with abrasions)


Neurological: Reports: No Symptoms





ED EXAM, GENERAL





- Physical Exam


Exam: See Below


Exam Limited By: No Limitations


General Appearance: Alert, WD/WN, Mild Distress


Eye Exam: Bilateral Eye: EOMI, Normal Inspection


Ears: Normal External Exam, Hearing Grossly Normal


Ear Exam: Bilateral Ear: Auricle Normal


Nose: Normal Inspection, Normal Mucosa, No Blood


Throat/Mouth: Normal Inspection, Normal Oropharynx, Normal Voice, No Airway 

Compromise


Head: Atraumatic, Normocephalic


Neck: Normal Inspection, Supple, Non-Tender, Full Range of Motion


Respiratory/Chest: No Respiratory Distress, Lungs Clear, Normal Breath Sounds, 

No Accessory Muscle Use


Cardiovascular: Normal Peripheral Pulses, Regular Rate, Rhythm, No Murmur


Peripheral Pulses: 2+: Radial (L), Radial (R), Dorsalis Pedis (L), Dorsalis 

Pedis (R)


GI/Abdominal: Normal Bowel Sounds, Soft, Non-Tender


Back Exam: Normal Inspection


Extremities: No Pedal Edema, Normal Capillary Refill, Other (Tender over the 

proximal aspect of the proximal phalanx of the fifth toe)


Neurological: Alert, Oriented, CN II-XII Intact, Normal Cognition, No Motor/

Sensory Deficits


Skin Exam: Warm, Dry, Normal Color, No Rash, Wound/Incision (Abrasions on the 

fourth and fifth toes.)





Course





- Vital Signs


Last Recorded V/S: 


 Last Vital Signs











Temp  36.3 C   20 17:45


 


Pulse  98   20 17:45


 


Resp  20   20 17:45


 


BP  138/74   20 17:45


 


Pulse Ox  99   20 17:45














- Orders/Labs/Meds


Orders: 


 Active Orders 24 hr











 Category Date Time Status


 


 Vaccines to be Administered [RC] PER UNIT ROUTINE Care  20 17:57 Active


 


 Foot Comp Min 3V Rt [CR] Stat Exams  20 17:56 Taken











Meds: 


Medications














Discontinued Medications














Generic Name Dose Route Start Last Admin





  Trade Name Freq  PRN Reason Stop Dose Admin


 


Diphtheria/Tetanus/Acell Pertussis  0.5 ml  20 17:57  20 18:15





  Adacel  IM  20 17:58  0.5 ml





  .ONCE ONE   Administration





     





     





     





     














- Radiology Interpretation


Free Text/Narrative:: 





X-ray of right foot shows fracture of base of the proximal phalanx of the right 

fifth toe.





- Re-Assessments/Exams


Free Text/Narrative Re-Assessment/Exam: 





20 18:20: Patient has fracture of the base of the proximal phalanx of the 

fifth toe and it is somewhat angulated. The wounds are abrasions on both the 

fourth and fifth toes. These were cleaned by the nursing staff and an 

appropriate supportive dressing was applied to both toes. She has a flat bottom 

shoe at present. I will prescribe her Bactroban ointment to apply to the wounds 

times daily to try to prevent development of infection. I will also have her 

buddy tape the fourth and fifth toes for comfort and support in the next few 

days. She was told to her foot often and watch for any signs of infection. She 

was given a Tdap Immunization today to bring her tetanus immunization status up-

to-date.





Departure





- Departure


Time of Disposition: 18:35


Disposition: Home, Self-Care 01


Condition: Good


Clinical Impression: 


 Abrasion, right lesser toe(s), initial encounter





Fracture of fifth toe, right, closed


Qualifiers:


 Encounter type: initial encounter Qualified Code(s): S92.501A - Displaced 

unspecified fracture of right lesser toe(s), initial encounter for closed 

fracture








- Discharge Information


Prescriptions: 


Mupirocin Oint [Bactroban Oint] 22 gm TP TID #1 tube


Instructions:  Toe Fracture, Easy-to-Read, Wound Care, Adult, Abrasion, Easy-to-

Read


Referrals: 


Arnaud Ferrera MD [Primary Care Provider] - 


Additional Instructions: 


You have a fracture of the base of your right fifth toe. Clean the wounds with 

soap and water and apply the Bactroban ointment to the wounds 3 times daily 

until they have healed. You should tape the fourth and fifth toes together and 

wear flat bottomed shoes for comfort and support and ambulate as tolerated. Try 

to of the right foot for the next few days and try to elevate it as often as 

possible. You may take Tylenol for pain as needed. Back to the emergency 

department for increasing pain, redness, fever or any other concerning sign or 

symptom.





You were given a Tdap immunization today to bring your tetanus immunization 

status up-to-date.





Sepsis Event Note





- Focused Exam


Vital Signs: 


 Vital Signs











  Temp Pulse Resp BP Pulse Ox


 


 20 17:45  36.3 C  98  20  138/74  99











Date Exam was Performed: 20


Time Exam was Performed: 18:43





- My Orders


Last 24 Hours: 


My Active Orders





20 17:56


Foot Comp Min 3V Rt [CR] Stat 





20 17:57


Vaccines to be Administered [RC] PER UNIT ROUTINE 














- Assessment/Plan


Last 24 Hours: 


My Active Orders





20 17:56


Foot Comp Min 3V Rt [CR] Stat 





20 17:57


Vaccines to be Administered [RC] PER UNIT ROUTINE

## 2020-05-27 NOTE — CR
INDICATION:  Kicked object with right foot, now with pain. 



RIGHT FOOT:  Three views of the right foot revealed an oblique fracture through 
the proximal metaphysis - shaft of the proximal phalanx of the 5th toe with 
medial angulation and possibly minimal dorsal angulation, at the fracture site.
  



No other significant appearing bone or joint abnormality was identified.  



IMPRESSION: Medial dorsal angulation of the proximal phalangeal 5th toe 
fracture site on the right.  
MTDD